# Patient Record
Sex: MALE | URBAN - METROPOLITAN AREA
[De-identification: names, ages, dates, MRNs, and addresses within clinical notes are randomized per-mention and may not be internally consistent; named-entity substitution may affect disease eponyms.]

---
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
## 2025-01-02 ENCOUNTER — APPOINTMENT (OUTPATIENT)
Dept: URBAN - METROPOLITAN AREA CLINIC 277 | Age: 25
Setting detail: DERMATOLOGY
End: 2025-01-02

## 2025-01-02 ENCOUNTER — RX ONLY (RX ONLY)
Age: 25
End: 2025-01-02

## 2025-01-02 DIAGNOSIS — L30.9 DERMATITIS, UNSPECIFIED: ICD-10-CM

## 2025-01-02 PROCEDURE — 99203 OFFICE O/P NEW LOW 30 MIN: CPT

## 2025-01-02 PROCEDURE — OTHER COUNSELING: OTHER

## 2025-01-02 PROCEDURE — OTHER PRESCRIPTION: OTHER

## 2025-01-02 PROCEDURE — OTHER KOH PREP: OTHER

## 2025-01-02 PROCEDURE — OTHER MIPS QUALITY: OTHER

## 2025-01-02 PROCEDURE — OTHER TREATMENT REGIMEN: OTHER

## 2025-01-02 RX ORDER — PRAMOXINE HYDROCHLORIDE, MENTHOL .5; 1 MG/1000ML; MG/1000ML
LOTION TOPICAL
Qty: 222 | Refills: 0 | Status: ERX | COMMUNITY
Start: 2025-01-02

## 2025-01-02 RX ORDER — IVERMECTIN 3 MG/1
TABLET ORAL
Qty: 12 | Refills: 0 | Status: ERX | COMMUNITY
Start: 2025-01-02

## 2025-01-02 ASSESSMENT — LOCATION ZONE DERM
LOCATION ZONE: ARM
LOCATION ZONE: HAND

## 2025-01-02 ASSESSMENT — LOCATION SIMPLE DESCRIPTION DERM
LOCATION SIMPLE: LEFT HAND
LOCATION SIMPLE: RIGHT WRIST
LOCATION SIMPLE: RIGHT HAND

## 2025-01-02 ASSESSMENT — LOCATION DETAILED DESCRIPTION DERM
LOCATION DETAILED: RIGHT VENTRAL WRIST
LOCATION DETAILED: LEFT RADIAL DORSAL HAND
LOCATION DETAILED: RIGHT RADIAL DORSAL HAND